# Patient Record
Sex: MALE | Race: ASIAN | NOT HISPANIC OR LATINO
[De-identification: names, ages, dates, MRNs, and addresses within clinical notes are randomized per-mention and may not be internally consistent; named-entity substitution may affect disease eponyms.]

---

## 2017-01-10 ENCOUNTER — APPOINTMENT (OUTPATIENT)
Dept: UROLOGY | Facility: CLINIC | Age: 52
End: 2017-01-10

## 2017-01-10 VITALS
WEIGHT: 174 LBS | SYSTOLIC BLOOD PRESSURE: 134 MMHG | RESPIRATION RATE: 17 BRPM | HEIGHT: 66 IN | DIASTOLIC BLOOD PRESSURE: 84 MMHG | OXYGEN SATURATION: 98 % | BODY MASS INDEX: 27.97 KG/M2 | HEART RATE: 95 BPM | TEMPERATURE: 98.6 F

## 2017-01-10 DIAGNOSIS — Z00.00 ENCOUNTER FOR GENERAL ADULT MEDICAL EXAMINATION W/OUT ABNORMAL FINDINGS: ICD-10-CM

## 2017-01-11 LAB
ANION GAP SERPL CALC-SCNC: 14 MMOL/L
APPEARANCE: CLEAR
BACTERIA: NEGATIVE
BILIRUBIN URINE: NEGATIVE
BLOOD URINE: NEGATIVE
BUN SERPL-MCNC: 16 MG/DL
CALCIUM OXALATE CRYSTALS: ABNORMAL
CALCIUM SERPL-MCNC: 9.9 MG/DL
CHLORIDE SERPL-SCNC: 103 MMOL/L
CO2 SERPL-SCNC: 25 MMOL/L
COLOR: YELLOW
CREAT SERPL-MCNC: 0.87 MG/DL
GLUCOSE QUALITATIVE U: NORMAL MG/DL
GLUCOSE SERPL-MCNC: 160 MG/DL
HYALINE CASTS: 0 /LPF
KETONES URINE: NEGATIVE
LEUKOCYTE ESTERASE URINE: NEGATIVE
MICROSCOPIC-UA: NORMAL
NITRITE URINE: NEGATIVE
PH URINE: 5.5
POTASSIUM SERPL-SCNC: 4.1 MMOL/L
PROTEIN URINE: NEGATIVE MG/DL
PSA FREE FLD-MCNC: 18.5 %
PSA FREE SERPL-MCNC: 0.13 NG/ML
PSA SERPL-MCNC: 0.71 NG/ML
RED BLOOD CELLS URINE: 1 /HPF
SODIUM SERPL-SCNC: 142 MMOL/L
SPECIFIC GRAVITY URINE: 1.02
SQUAMOUS EPITHELIAL CELLS: 0 /HPF
UROBILINOGEN URINE: NORMAL MG/DL
WHITE BLOOD CELLS URINE: 1 /HPF

## 2017-01-12 LAB — CORE LAB FLUID CYTOLOGY: NORMAL

## 2017-01-31 ENCOUNTER — APPOINTMENT (OUTPATIENT)
Dept: UROLOGY | Facility: CLINIC | Age: 52
End: 2017-01-31

## 2017-01-31 DIAGNOSIS — R31.21 ASYMPTOMATIC MICROSCOPIC HEMATURIA: ICD-10-CM

## 2017-03-28 ENCOUNTER — APPOINTMENT (OUTPATIENT)
Dept: UROLOGY | Facility: CLINIC | Age: 52
End: 2017-03-28

## 2017-03-28 VITALS
HEIGHT: 66 IN | HEART RATE: 71 BPM | OXYGEN SATURATION: 95 % | RESPIRATION RATE: 17 BRPM | BODY MASS INDEX: 33.27 KG/M2 | TEMPERATURE: 97.9 F | SYSTOLIC BLOOD PRESSURE: 120 MMHG | WEIGHT: 207 LBS | DIASTOLIC BLOOD PRESSURE: 80 MMHG

## 2017-03-28 PROBLEM — R31.21 ASYMPTOMATIC MICROSCOPIC HEMATURIA: Status: ACTIVE | Noted: 2017-01-10

## 2022-07-08 ENCOUNTER — APPOINTMENT (OUTPATIENT)
Dept: CARDIOLOGY | Facility: CLINIC | Age: 57
End: 2022-07-08

## 2022-07-11 ENCOUNTER — APPOINTMENT (OUTPATIENT)
Dept: CARDIOLOGY | Facility: CLINIC | Age: 57
End: 2022-07-11

## 2022-07-11 VITALS
DIASTOLIC BLOOD PRESSURE: 97 MMHG | TEMPERATURE: 97.6 F | HEART RATE: 83 BPM | SYSTOLIC BLOOD PRESSURE: 151 MMHG | BODY MASS INDEX: 29.54 KG/M2 | OXYGEN SATURATION: 97 % | WEIGHT: 183 LBS | RESPIRATION RATE: 17 BRPM

## 2022-07-11 DIAGNOSIS — I10 ESSENTIAL (PRIMARY) HYPERTENSION: ICD-10-CM

## 2022-07-11 DIAGNOSIS — Z01.810 ENCOUNTER FOR PREPROCEDURAL CARDIOVASCULAR EXAMINATION: ICD-10-CM

## 2022-07-11 DIAGNOSIS — R94.31 ABNORMAL ELECTROCARDIOGRAM [ECG] [EKG]: ICD-10-CM

## 2022-07-11 PROCEDURE — 99214 OFFICE O/P EST MOD 30 MIN: CPT | Mod: 25

## 2022-07-11 PROCEDURE — 93306 TTE W/DOPPLER COMPLETE: CPT

## 2022-07-11 RX ORDER — LOSARTAN POTASSIUM 100 MG/1
100 TABLET, FILM COATED ORAL
Qty: 30 | Refills: 0 | Status: ACTIVE | COMMUNITY
Start: 2022-07-11

## 2022-07-21 PROBLEM — Z01.810 PRE-OPERATIVE CARDIOVASCULAR EXAMINATION: Status: ACTIVE | Noted: 2022-07-21

## 2022-07-21 NOTE — HISTORY OF PRESENT ILLNESS
[FreeTextEntry1] : 7/11/22- Patient reports that he is getting dental implants and was found to have abnormal ECG and needs clearance. Patient reports home BP is okay, but he doesn't take measurement normally. Patient reports that his BP is elevated because he did not take his medications for 3 days and also did not sleep well. Patient denies CP, SOB, palpitations, or lightheadedness.   I advised patient to watch his BP. I advised patient to undergo an echocardiogram. \par \par 1/26/16 - Patient reports that his back pain is better.    Patient reports that he has a cold and has been coughing.  He reports that 1 week ago at night sitting in bed he fainted after coughing.  He denies CP or SOB or palpitations before or after the episode.   He takes Atenolol 25 mg, Amlodipine 5 mg, and Losartan 50 mg for HTN.  He takes Simvastatin for HLD.

## 2022-07-21 NOTE — REASON FOR VISIT
[FreeTextEntry1] : 56-year-old male smoker with HTN and HLD presents for followup.  \par \par Patient was last seen on 1/26/16 for syncope after coughing.  I advised patient to undergo an echocardiogram which he did not do.\par \par He takes Atenolol 25 mg, Amlodipine 5 mg, and Losartan 100 mg for HTN.  He takes Simvastatin 10 mg for HLD.

## 2022-07-21 NOTE — DISCUSSION/SUMMARY
[FreeTextEntry1] : 50-year-old male smoker with HTN and HLD presents for followup.  Patient was last seen on 10/14/14 for upper back pain.  Patient had a normal stress test.  Patient reports that his back pain is better.    Patient reports that he has a cold and has been coughing.  He reports that 1 week ago at night sitting in bed he fainted after coughing.  He denies CP or SOB or palpitations before or after the episode.   He takes Atenolol 25 mg, Amlodipine 5 mg, and Losartan 50 mg for HTN.  He takes Simvastatin for HLD.\par \par (1) Syncope - He likely had a tussive syncopal episode.  I advised patient to return for an echocardiogram to rule out structural heart disease.  I will obtain insurance authorization.\par \par (2) HTN - His BP is on the low side today.  He can continue Atenolol 25 mg, Amlodipine 5 mg, and Losartan 50 mg for now.  If his BP remains low, Amlodipine can be reduced to 2.5 mg.\par \par (3) Followup - pending echo.